# Patient Record
(demographics unavailable — no encounter records)

---

## 2024-11-06 NOTE — DATA REVIEWED
[I independently reviewed and interpreted images and report] : I independently reviewed and interpreted images and report [FreeTextEntry1] : in office x-rays cervical spine ap/lat 11/04/2024 demonstrate good maintenance of alignment and implant placement, good progress to fusion

## 2024-11-06 NOTE — PHYSICAL EXAM
[Normal Coordination] : normal coordination [Normal DTR UE/LE] : normal DTR UE/LE  [Normal Sensation] : normal sensation [Normal Mood and Affect] : normal mood and affect [Oriented] : oriented [Able to Communicate] : able to communicate [Normal Skin] : normal skin [No Rash] : no rash [No Ulcers] : no ulcers [No Lesions] : no lesions [No obvious lymphadenopathy in areas examined] : no obvious lymphadenopathy in areas examined [Well Developed] : well developed [Peripheral vascular exam is grossly normal] : peripheral vascular exam is grossly normal [No Respiratory Distress] : no respiratory distress [Biceps 2+] : biceps 2+ [Triceps 2+] : triceps 2+ [Brachioradialis 2+] : brachioradialis 2+ [] : no sero-sanguinous drainage

## 2024-11-06 NOTE — HISTORY OF PRESENT ILLNESS
[de-identified] : 11/04/2024 - The patient underwent an anterior cervical discectomy and fusion (ACDF) at C4-6 on July 16, 2024. He reports that he has been doing well, with no complaints. He experiences mild pain in the neck when looking to the left, but neurological functions have resolved. Pleased with his progress from surgery.   09/25/2024 - Patient returns to the office for third postoperative visit. Reports significant improvement and symptoms compared to preoperative discomfort. Occasionally, having some difficulty with vocalizations if he is trying to yell or make a loud noise. Normal conversation there is no difficulty. Occasionally using Percocet for pain, but otherwise controlling with Tylenol and inflammatory. Continues to calf. Continues to use bone stimulator.   08/16/2024 - Pt here for second post-operative visit following an ACDF at C4-6 on July 16, 2024. No difficulty with swallowing or incision. Complains of axial neck stiffness but overall tolerable. Takes pain medication during heightened severity. States pre op numbness has resolved.   07/26/2024 - The patient is presenting for their first post-operative visit following an ACDF at C4-6 on July 16, 2024. Overall, he is doing well post-operatively and reports no constitutional symptoms. He notes that the pre-operative numbness has resolved. However, he is experiencing upper back and neck pain. No difficulty with swallowing.

## 2024-11-06 NOTE — DISCUSSION/SUMMARY
[de-identified] : 68 y/o M S/P ACDF C4-6 on July 16, 2024. Patient reports resolved neurologic preoperative symptoms. Postoperative activity restrictions reinforced. Encourage patient to discontinue smoking. Continue HEP as tolerated. He will follow up in the office in six weeks.  Prior to appointment and during encounter with patient extensive medical records were reviewed including but not limited to, hospital records, outpatient records, imaging results, and lab data. During this appointment the patient was examined, diagnoses were discussed and explained in a face to face manner. In addition extensive time was spent reviewing aforementioned diagnostic studies. Counseling including abnormal image results, differential diagnoses, treatment options, risk and benefits, lifestyle changes, current condition, and current medications was performed. Patient's comments, questions, and concerns were addressed and patient verbalized understanding. Based on this patient's presentation at our office, which is an orthopedic spine surgeon's office, this patient inherently / intrinsically has a risk, however minute, of developing issues such as Cauda equina syndrome, bowel and bladder changes, or progression of motor or neurological deficits such as paralysis which may be permanent.  SARAHI CAIN Acting as a Scribe for Dr. Hardik MTZ, Sarahi Cain, attest that this documentation has been prepared under the direction and in the presence of Provider Sammy Dye MD.

## 2024-11-06 NOTE — HISTORY OF PRESENT ILLNESS
[de-identified] : 11/04/2024 - The patient underwent an anterior cervical discectomy and fusion (ACDF) at C4-6 on July 16, 2024. He reports that he has been doing well, with no complaints. He experiences mild pain in the neck when looking to the left, but neurological functions have resolved. Pleased with his progress from surgery.   09/25/2024 - Patient returns to the office for third postoperative visit. Reports significant improvement and symptoms compared to preoperative discomfort. Occasionally, having some difficulty with vocalizations if he is trying to yell or make a loud noise. Normal conversation there is no difficulty. Occasionally using Percocet for pain, but otherwise controlling with Tylenol and inflammatory. Continues to calf. Continues to use bone stimulator.   08/16/2024 - Pt here for second post-operative visit following an ACDF at C4-6 on July 16, 2024. No difficulty with swallowing or incision. Complains of axial neck stiffness but overall tolerable. Takes pain medication during heightened severity. States pre op numbness has resolved.   07/26/2024 - The patient is presenting for their first post-operative visit following an ACDF at C4-6 on July 16, 2024. Overall, he is doing well post-operatively and reports no constitutional symptoms. He notes that the pre-operative numbness has resolved. However, he is experiencing upper back and neck pain. No difficulty with swallowing.

## 2024-11-06 NOTE — DISCUSSION/SUMMARY
[de-identified] : 66 y/o M S/P ACDF C4-6 on July 16, 2024. Patient reports resolved neurologic preoperative symptoms. Postoperative activity restrictions reinforced. Encourage patient to discontinue smoking. Continue HEP as tolerated. He will follow up in the office in six weeks.  Prior to appointment and during encounter with patient extensive medical records were reviewed including but not limited to, hospital records, outpatient records, imaging results, and lab data. During this appointment the patient was examined, diagnoses were discussed and explained in a face to face manner. In addition extensive time was spent reviewing aforementioned diagnostic studies. Counseling including abnormal image results, differential diagnoses, treatment options, risk and benefits, lifestyle changes, current condition, and current medications was performed. Patient's comments, questions, and concerns were addressed and patient verbalized understanding. Based on this patient's presentation at our office, which is an orthopedic spine surgeon's office, this patient inherently / intrinsically has a risk, however minute, of developing issues such as Cauda equina syndrome, bowel and bladder changes, or progression of motor or neurological deficits such as paralysis which may be permanent.  SARAHI CAIN Acting as a Scribe for Dr. Hardik MTZ, Sarahi Cain, attest that this documentation has been prepared under the direction and in the presence of Provider Sammy Dye MD.

## 2024-12-30 NOTE — DISCUSSION/SUMMARY
[de-identified] : 66 y/o M S/P ACDF C4-6 on July 16, 2024. Revied XR taken today revealing good maintenance of alignment and implant placement, solid fusion. Patient reports resolved neurologic preoperative symptoms. Postoperative activity restrictions reinforced. Encourage patient to discontinue smoking. Continue HEP as tolerated. F/u prn.   Prior to appointment and during encounter with patient extensive medical records were reviewed including but not limited to, hospital records, outpatient records, imaging results, and lab data. During this appointment the patient was examined, diagnoses were discussed and explained in a face to face manner. In addition extensive time was spent reviewing aforementioned diagnostic studies. Counseling including abnormal image results, differential diagnoses, treatment options, risk and benefits, lifestyle changes, current condition, and current medications was performed. Patient's comments, questions, and concerns were addressed and patient verbalized understanding. Based on this patient's presentation at our office, which is an orthopedic spine surgeon's office, this patient inherently / intrinsically has a risk, however minute, of developing issues such as Cauda equina syndrome, bowel and bladder changes, or progression of motor or neurological deficits such as paralysis which may be permanent.  SARAHI BATISTA Acting as a Scribe for Sarahi Paniagua, attest that this documentation has been prepared under the direction and in the presence of Provider Sammy Dye MD.

## 2024-12-30 NOTE — PHYSICAL EXAM
[Normal Coordination] : normal coordination [Normal DTR UE/LE] : normal DTR UE/LE  [Normal Sensation] : normal sensation [Normal Mood and Affect] : normal mood and affect [Oriented] : oriented [Able to Communicate] : able to communicate [Normal Skin] : normal skin [No Rash] : no rash [No Ulcers] : no ulcers [No Lesions] : no lesions [No obvious lymphadenopathy in areas examined] : no obvious lymphadenopathy in areas examined [Well Developed] : well developed [Peripheral vascular exam is grossly normal] : peripheral vascular exam is grossly normal [No Respiratory Distress] : no respiratory distress [Biceps 2+] : biceps 2+ [Triceps 2+] : triceps 2+ [Brachioradialis 2+] : brachioradialis 2+ [] : no rhomboid tenderness

## 2025-07-11 NOTE — HISTORY OF PRESENT ILLNESS
[Radiating] : radiating [Sleep] : sleep [Retired] : Work status: retired [de-identified] : 07/11/2025 - Patient returns for FUV.  Complains of increased R > L radicular arm pain. Was pain free up until 2 months ago without trauma or injury. Pain is worse while driving. Unable to specify if there is weakness. Takes Etodolac with some relief.   12/30/2024 - Patient presenting in follow up 5 months removed from ACDF. He reports significant improvement following surgery and reports zero radicular arm pain.   11/04/2024 - The patient underwent an anterior cervical discectomy and fusion (ACDF) at C4-6 on July 16, 2024. He reports that he has been doing well, with no complaints. He experiences mild pain in the neck when looking to the left, but neurological functions have resolved. Pleased with his progress from surgery.   09/25/2024 - Patient returns to the office for third postoperative visit. Reports significant improvement and symptoms compared to preoperative discomfort. Occasionally, having some difficulty with vocalizations if he is trying to yell or make a loud noise. Normal conversation there is no difficulty. Occasionally using Percocet for pain, but otherwise controlling with Tylenol and inflammatory. Continues to calf. Continues to use bone stimulator.   08/16/2024 - Pt here for second post-operative visit following an ACDF at C4-6 on July 16, 2024. No difficulty with swallowing or incision. Complains of axial neck stiffness but overall tolerable. Takes pain medication during heightened severity. States pre op numbness has resolved.   07/26/2024 - The patient is presenting for their first post-operative visit following an ACDF at C4-6 on July 16, 2024. Overall, he is doing well post-operatively and reports no constitutional symptoms. He notes that the pre-operative numbness has resolved. However, he is experiencing upper back and neck pain. No difficulty with swallowing.     [] : no [FreeTextEntry1] : C spine  [FreeTextEntry2] : NKI, pain came back on since sx [FreeTextEntry7] : Down RT arm and LT fingers he feels numbness  [FreeTextEntry8] : Cannot sleep on LT side  [de-identified] : Driving  [de-identified] : 7/16/24

## 2025-07-11 NOTE — HISTORY OF PRESENT ILLNESS
[Radiating] : radiating [Sleep] : sleep [Retired] : Work status: retired [de-identified] : 07/11/2025 - Patient returns for FUV.  Complains of increased R > L radicular arm pain. Was pain free up until 2 months ago without trauma or injury. Pain is worse while driving. Unable to specify if there is weakness. Takes Etodolac with some relief.   12/30/2024 - Patient presenting in follow up 5 months removed from ACDF. He reports significant improvement following surgery and reports zero radicular arm pain.   11/04/2024 - The patient underwent an anterior cervical discectomy and fusion (ACDF) at C4-6 on July 16, 2024. He reports that he has been doing well, with no complaints. He experiences mild pain in the neck when looking to the left, but neurological functions have resolved. Pleased with his progress from surgery.   09/25/2024 - Patient returns to the office for third postoperative visit. Reports significant improvement and symptoms compared to preoperative discomfort. Occasionally, having some difficulty with vocalizations if he is trying to yell or make a loud noise. Normal conversation there is no difficulty. Occasionally using Percocet for pain, but otherwise controlling with Tylenol and inflammatory. Continues to calf. Continues to use bone stimulator.   08/16/2024 - Pt here for second post-operative visit following an ACDF at C4-6 on July 16, 2024. No difficulty with swallowing or incision. Complains of axial neck stiffness but overall tolerable. Takes pain medication during heightened severity. States pre op numbness has resolved.   07/26/2024 - The patient is presenting for their first post-operative visit following an ACDF at C4-6 on July 16, 2024. Overall, he is doing well post-operatively and reports no constitutional symptoms. He notes that the pre-operative numbness has resolved. However, he is experiencing upper back and neck pain. No difficulty with swallowing.     [] : no [FreeTextEntry1] : C spine  [FreeTextEntry2] : NKI, pain came back on since sx [FreeTextEntry7] : Down RT arm and LT fingers he feels numbness  [FreeTextEntry8] : Cannot sleep on LT side  [de-identified] : Driving  [de-identified] : 7/16/24

## 2025-07-11 NOTE — DISCUSSION/SUMMARY
[de-identified] : 67 y/o M S/P ACDF C4-6 on July 16, 2024. Revied XR taken today revealing good maintenance of alignment and implant placement, solid fusion. Referred for cervical spine mri to evaluate for adjacent segment stenosis vs hnp, patient has persistent bilateral radic arm pain, h/o cervical fusion 1 year ago. F/u after cervical MRI for further treatment plan.   Prior to appointment and during encounter with patient extensive medical records were reviewed including but not limited to, hospital records, outpatient records, imaging results, and lab data. During this appointment the patient was examined, diagnoses were discussed and explained in a face to face manner. In addition extensive time was spent reviewing aforementioned diagnostic studies. Counseling including abnormal image results, differential diagnoses, treatment options, risk and benefits, lifestyle changes, current condition, and current medications was performed. Patient's comments, questions, and concerns were addressed and patient verbalized understanding. Based on this patient's presentation at our office, which is an orthopedic spine surgeon's office, this patient inherently / intrinsically has a risk, however minute, of developing issues such as Cauda equina syndrome, bowel and bladder changes, or progression of motor or neurological deficits such as paralysis which may be permanent.  SARAHI CAIN Acting as a Scribe for Dr. Hardik MTZ, Sarahi Cain, attest that this documentation has been prepared under the direction and in the presence of Provider Sammy Dye MD.

## 2025-07-11 NOTE — DATA REVIEWED
[FreeTextEntry1] : in office x-rays cervical spine ap/lat 07/11/2025 demonstrate good maintenance of alignment and implant placement, solid fusion  I stop paperwork reviewed

## 2025-07-11 NOTE — PHYSICAL EXAM
[Normal Coordination] : normal coordination [Normal DTR UE/LE] : normal DTR UE/LE  [Normal Sensation] : normal sensation [Normal Mood and Affect] : normal mood and affect [Oriented] : oriented [Able to Communicate] : able to communicate [Normal Skin] : normal skin [No Rash] : no rash [No Ulcers] : no ulcers [No Lesions] : no lesions [No obvious lymphadenopathy in areas examined] : no obvious lymphadenopathy in areas examined [Well Developed] : well developed [Peripheral vascular exam is grossly normal] : peripheral vascular exam is grossly normal [No Respiratory Distress] : no respiratory distress [] : sensation of right upper extremities intact [Biceps 2+] : biceps 2+ [Triceps 2+] : triceps 2+ [Brachioradialis 2+] : brachioradialis 2+

## 2025-07-11 NOTE — DISCUSSION/SUMMARY
[de-identified] : 67 y/o M S/P ACDF C4-6 on July 16, 2024. Revied XR taken today revealing good maintenance of alignment and implant placement, solid fusion. Referred for cervical spine mri to evaluate for adjacent segment stenosis vs hnp, patient has persistent bilateral radic arm pain, h/o cervical fusion 1 year ago. F/u after cervical MRI for further treatment plan.   Prior to appointment and during encounter with patient extensive medical records were reviewed including but not limited to, hospital records, outpatient records, imaging results, and lab data. During this appointment the patient was examined, diagnoses were discussed and explained in a face to face manner. In addition extensive time was spent reviewing aforementioned diagnostic studies. Counseling including abnormal image results, differential diagnoses, treatment options, risk and benefits, lifestyle changes, current condition, and current medications was performed. Patient's comments, questions, and concerns were addressed and patient verbalized understanding. Based on this patient's presentation at our office, which is an orthopedic spine surgeon's office, this patient inherently / intrinsically has a risk, however minute, of developing issues such as Cauda equina syndrome, bowel and bladder changes, or progression of motor or neurological deficits such as paralysis which may be permanent.  SARAHI CAIN Acting as a Scribe for Dr. Hardik MTZ, Sarahi Cain, attest that this documentation has been prepared under the direction and in the presence of Provider Sammy Dye MD.